# Patient Record
(demographics unavailable — no encounter records)

---

## 2024-12-23 NOTE — ASSESSMENT
[FreeTextEntry1] : # Hepatic cysts, asymptomatic: - We discussed that simple hepatic cysts are a common benign liver lesion. Asymptomatic simple cysts, even when large, do not require routine surveillance. Risks of complications such as cyst superinfection or rupture are rare. - MRI abdomen with and without contrast ordered today for further evaluation of her cysts including to confirm that they appear to be simple cysts radiologically. - Echinococcus antibody ordered with labs today, though she does not have obvious risk factors.  # Health maintenance: - We will check HAV and HBV serologies with labs today and she will be offered vaccinations if non-immune. - We will check HCV Ab (with reflex PCR) with labs today for screening to rule out chronic HCV infection. - She has metabolic risk factors and is working with her PCP and cardiologist to address them, including with healthy lifestyle changes. She was encouraged to follow up with them including re: her question of whether to start lipid-lowering medication. If she has hepatic steatosis seen on MRI, we will address that with further hepatology follow up but her most recent liver chemistries earlier this year were all within normal limits. - She is unsure when she underwent screening colonoscopy with her former GI DrHillary Arellano but says she was told her next colonoscopy was due 10 years later due to no polyps seen. She is average risk for colon cancer by history. She is planning to establish care with GI Dr. Foreign Villalobos and was encouraged to find and review her prior colonoscopy report to confirm when her next colon cancer screening is due. - Ms. KENNY was counseled to: consume alcohol only in moderation (<=1 standard drink/day and <=7 standard drinks/week for women), with the less alcohol consumption the better for liver and overall health; avoid use of herbal and dietary supplements due to potential hepatotoxicity; and limit use of acetaminophen to <2 grams per day.  We will determine the need for further hepatology follow up pending her lab and MRI results.

## 2024-12-23 NOTE — HISTORY OF PRESENT ILLNESS
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 She is self-referred.  She reports feeling well overall and denies any abdominal pain, early satiety, nausea/vomiting, regurgitation, or unintentional weight loss. She has no prior known acute or chronic liver disease and has not had any dedicated hepatobiliary imaging yet.   She has no known family history of liver disease.  She is  and lives with her . They have two  adult children. She typically drinks 1 1/2 glasses of wine 3 days/week on average, but occasionally more during the holidays. Never smoker. No history of recreational drug use.

## 2024-12-23 NOTE — PHYSICAL EXAM
[General Appearance - Alert] : alert [General Appearance - In No Acute Distress] : in no acute distress [General Appearance - Well Developed] : well developed [Sclera] : the sclera and conjunctiva were normal [Abdomen Soft] : soft [Abdomen Tenderness] : non-tender [Abdomen Mass (___ Cm)] : no abdominal mass palpated [Abnormal Walk] : normal gait [Skin Color & Pigmentation] : normal skin color and pigmentation [No Focal Deficits] : no focal deficits [Oriented To Time, Place, And Person] : oriented to person, place, and time [Impaired Insight] : insight and judgment were intact [Scleral Icterus] : No Scleral Icterus [Spider Angioma] : No spider angioma(s) were observed [Abdominal  Ascites] : no ascites [Splenomegaly] : no splenomegaly [Caput Medusae] : no caput medusae observed [Non-Tender] : non-tender [Smooth] : smooth [Asterixis] : no asterixis observed [Jaundice] : No jaundice [Palmar Erythema] : no Palmar Erythema [General Appearance - Well Nourished] : well nourished [General Appearance - Well-Appearing] : healthy appearing [Hearing Threshold Finger Rub Not [place]] : hearing was normal [Oropharynx] : the oropharynx was normal [Neck Appearance] : the appearance of the neck was normal [Neck Cervical Mass (___cm)] : no neck mass was observed [Jugular Venous Distention Increased] : there was no jugular-venous distention [Respiration, Rhythm And Depth] : normal respiratory rhythm and effort [Exaggerated Use Of Accessory Muscles For Inspiration] : no accessory muscle use [Auscultation Breath Sounds / Voice Sounds] : lungs were clear to auscultation bilaterally [Heart Rate And Rhythm] : heart rate was normal and rhythm regular [Heart Sounds] : normal S1 and S2 [Edema] : there was no peripheral edema [Bowel Sounds] : normal bowel sounds [] : no hepato-splenomegaly [Nail Clubbing] : no clubbing  or cyanosis of the fingernails [Involuntary Movements] : no involuntary movements were seen [Skin Turgor] : normal skin turgor [Affect] : the affect was normal [Mood] : the mood was normal [Memory Recent] : recent memory was not impaired [Memory Remote] : remote memory was not impaired

## 2025-05-06 NOTE — HEALTH RISK ASSESSMENT
[Good] : ~his/her~  mood as  good [No falls in past year] : Patient reported no falls in the past year [0] : 2) Feeling down, depressed, or hopeless: Not at all (0) [PHQ-2 Negative - No further assessment needed] : PHQ-2 Negative - No further assessment needed [Never] : Never [Patient reported mammogram was normal] : Patient reported mammogram was normal [Patient reported PAP Smear was normal] : Patient reported PAP Smear was normal [Patient reported bone density results were normal] : Patient reported bone density results were normal [Patient reported colonoscopy was normal] : Patient reported colonoscopy was normal [None] : None [With Family] : lives with family [Employed] : employed [] :  [Sexually Active] : sexually active [Fully functional (bathing, dressing, toileting, transferring, walking, feeding)] : Fully functional (bathing, dressing, toileting, transferring, walking, feeding) [Fully functional (using the telephone, shopping, preparing meals, housekeeping, doing laundry, using] : Fully functional and needs no help or supervision to perform IADLs (using the telephone, shopping, preparing meals, housekeeping, doing laundry, using transportation, managing medications and managing finances) [No] : In the past 12 months have you used drugs other than those required for medical reasons? No [Yes] : takes